# Patient Record
Sex: MALE | Race: BLACK OR AFRICAN AMERICAN | Employment: FULL TIME | URBAN - METROPOLITAN AREA
[De-identification: names, ages, dates, MRNs, and addresses within clinical notes are randomized per-mention and may not be internally consistent; named-entity substitution may affect disease eponyms.]

---

## 2019-02-11 ENCOUNTER — APPOINTMENT (EMERGENCY)
Dept: CT IMAGING | Facility: HOSPITAL | Age: 34
End: 2019-02-11
Payer: COMMERCIAL

## 2019-02-11 ENCOUNTER — HOSPITAL ENCOUNTER (EMERGENCY)
Facility: HOSPITAL | Age: 34
Discharge: HOME | End: 2019-02-11
Attending: EMERGENCY MEDICINE | Admitting: EMERGENCY MEDICINE
Payer: COMMERCIAL

## 2019-02-11 VITALS
TEMPERATURE: 98.9 F | HEART RATE: 69 BPM | OXYGEN SATURATION: 100 % | WEIGHT: 182 LBS | RESPIRATION RATE: 16 BRPM | DIASTOLIC BLOOD PRESSURE: 61 MMHG | SYSTOLIC BLOOD PRESSURE: 109 MMHG

## 2019-02-11 DIAGNOSIS — J36 PERITONSILLAR ABSCESS: Primary | ICD-10-CM

## 2019-02-11 LAB
ANION GAP SERPL CALCULATED.3IONS-SCNC: 9 MMOL/L (ref 4–13)
BASOPHILS # BLD AUTO: 0.03 THOUSANDS/ΜL (ref 0–0.1)
BASOPHILS NFR BLD AUTO: 0 % (ref 0–1)
BUN SERPL-MCNC: 13 MG/DL (ref 5–25)
CALCIUM SERPL-MCNC: 8.9 MG/DL (ref 8.3–10.1)
CHLORIDE SERPL-SCNC: 107 MMOL/L (ref 100–108)
CO2 SERPL-SCNC: 25 MMOL/L (ref 21–32)
CREAT SERPL-MCNC: 1 MG/DL (ref 0.6–1.3)
EOSINOPHIL # BLD AUTO: 0.03 THOUSAND/ΜL (ref 0–0.61)
EOSINOPHIL NFR BLD AUTO: 0 % (ref 0–6)
ERYTHROCYTE [DISTWIDTH] IN BLOOD BY AUTOMATED COUNT: 14.8 % (ref 11.6–15.1)
GFR SERPL CREATININE-BSD FRML MDRD: 113 ML/MIN/1.73SQ M
GLUCOSE SERPL-MCNC: 78 MG/DL (ref 65–140)
HCT VFR BLD AUTO: 41.4 % (ref 36.5–49.3)
HGB BLD-MCNC: 13.6 G/DL (ref 12–17)
IMM GRANULOCYTES # BLD AUTO: 0.05 THOUSAND/UL (ref 0–0.2)
IMM GRANULOCYTES NFR BLD AUTO: 0 % (ref 0–2)
LYMPHOCYTES # BLD AUTO: 1.72 THOUSANDS/ΜL (ref 0.6–4.47)
LYMPHOCYTES NFR BLD AUTO: 13 % (ref 14–44)
MCH RBC QN AUTO: 28.3 PG (ref 26.8–34.3)
MCHC RBC AUTO-ENTMCNC: 32.9 G/DL (ref 31.4–37.4)
MCV RBC AUTO: 86 FL (ref 82–98)
MONOCYTES # BLD AUTO: 1.6 THOUSAND/ΜL (ref 0.17–1.22)
MONOCYTES NFR BLD AUTO: 12 % (ref 4–12)
NEUTROPHILS # BLD AUTO: 9.95 THOUSANDS/ΜL (ref 1.85–7.62)
NEUTS SEG NFR BLD AUTO: 75 % (ref 43–75)
NRBC BLD AUTO-RTO: 0 /100 WBCS
PLATELET # BLD AUTO: 246 THOUSANDS/UL (ref 149–390)
PMV BLD AUTO: 9.1 FL (ref 8.9–12.7)
POTASSIUM SERPL-SCNC: 3.9 MMOL/L (ref 3.5–5.3)
RBC # BLD AUTO: 4.81 MILLION/UL (ref 3.88–5.62)
SODIUM SERPL-SCNC: 141 MMOL/L (ref 136–145)
WBC # BLD AUTO: 13.38 THOUSAND/UL (ref 4.31–10.16)

## 2019-02-11 PROCEDURE — 99284 EMERGENCY DEPT VISIT MOD MDM: CPT

## 2019-02-11 PROCEDURE — 96375 TX/PRO/DX INJ NEW DRUG ADDON: CPT

## 2019-02-11 PROCEDURE — 36415 COLL VENOUS BLD VENIPUNCTURE: CPT | Performed by: PHYSICIAN ASSISTANT

## 2019-02-11 PROCEDURE — 70491 CT SOFT TISSUE NECK W/DYE: CPT

## 2019-02-11 PROCEDURE — 96365 THER/PROPH/DIAG IV INF INIT: CPT

## 2019-02-11 PROCEDURE — 80048 BASIC METABOLIC PNL TOTAL CA: CPT | Performed by: PHYSICIAN ASSISTANT

## 2019-02-11 PROCEDURE — 85025 COMPLETE CBC W/AUTO DIFF WBC: CPT | Performed by: PHYSICIAN ASSISTANT

## 2019-02-11 RX ORDER — KETOROLAC TROMETHAMINE 30 MG/ML
15 INJECTION, SOLUTION INTRAMUSCULAR; INTRAVENOUS ONCE
Status: COMPLETED | OUTPATIENT
Start: 2019-02-11 | End: 2019-02-11

## 2019-02-11 RX ORDER — TRAMADOL HYDROCHLORIDE 50 MG/1
50 TABLET ORAL EVERY 6 HOURS PRN
Qty: 10 TABLET | Refills: 0 | Status: SHIPPED | OUTPATIENT
Start: 2019-02-11

## 2019-02-11 RX ORDER — PREDNISONE 10 MG/1
TABLET ORAL
Qty: 20 TABLET | Refills: 0 | Status: SHIPPED | OUTPATIENT
Start: 2019-02-11

## 2019-02-11 RX ORDER — CLINDAMYCIN PHOSPHATE 600 MG/50ML
600 INJECTION INTRAVENOUS ONCE
Status: COMPLETED | OUTPATIENT
Start: 2019-02-11 | End: 2019-02-11

## 2019-02-11 RX ORDER — NAPROXEN 500 MG/1
500 TABLET ORAL 2 TIMES DAILY WITH MEALS
Qty: 30 TABLET | Refills: 0 | Status: SHIPPED | OUTPATIENT
Start: 2019-02-11

## 2019-02-11 RX ORDER — CLINDAMYCIN HYDROCHLORIDE 300 MG/1
300 CAPSULE ORAL 3 TIMES DAILY
Qty: 30 CAPSULE | Refills: 0 | Status: SHIPPED | OUTPATIENT
Start: 2019-02-11 | End: 2019-02-21

## 2019-02-11 RX ADMIN — CLINDAMYCIN PHOSPHATE 600 MG: 600 INJECTION, SOLUTION INTRAVENOUS at 11:44

## 2019-02-11 RX ADMIN — IOHEXOL 85 ML: 350 INJECTION, SOLUTION INTRAVENOUS at 12:52

## 2019-02-11 RX ADMIN — DEXAMETHASONE SODIUM PHOSPHATE 10 MG: 10 INJECTION, SOLUTION INTRAMUSCULAR; INTRAVENOUS at 11:43

## 2019-02-11 RX ADMIN — KETOROLAC TROMETHAMINE 15 MG: 30 INJECTION, SOLUTION INTRAMUSCULAR at 11:42

## 2019-02-11 RX ADMIN — SODIUM CHLORIDE 1000 ML: 0.9 INJECTION, SOLUTION INTRAVENOUS at 11:42

## 2019-02-11 NOTE — DISCHARGE INSTRUCTIONS
DISCHARGE INSTRUCTIONS:    FOLLOW UP WITH YOUR PRIMARY CARE PROVIDER OR THE [address] [place] [address]  MAKE AN APPOINTMENT TO BE SEEN  TAKE NAPROXEN AS PRESCRIBED FOR MILD PAIN AND INFLAMMATION  IF RASH, SHORTNESS OF BREATH OR TROUBLE SWALLOWING, STOP TAKING THE MEDICATION AND BE SEEN  TAKE TRAMADOL AS PRESCRIBED FOR MODERATE PAIN  IF RASH, SHORTNESS OF BREATH OR TROUBLE SWALLOWING, STOP TAKING THE MEDICATION AND BE SEEN  NO DRINKING, DRIVING OR OPERATING HEAVY MACHINERY WHILE TAKING THIS MEDICATION  TAKE CLINDAMYCIN AS PRESCRIBED  IF RASH, SHORTNESS OF BREATH OR TROUBLE SWALLOWING, STOP TAKING THE MEDICATION AND BE SEEN  TAKE PREDNISONE AS PRESCRIBED  IF RASH, SHORTNESS OF BREATH OR TROUBLE SWALLOWING, STOP TAKING THE MEDICATION AND BE SEEN  REST AND DRINK PLENTY OF FLUIDS  FOLLOW UP WITH THE RECOMMENDED EARS, NOSE AND THROAT SPECIALIST  IF SYMPTOMS WORSEN OR NEW SYMPTOMS ARISE, RETURN TO THE ER TO BE SEEN

## 2019-02-11 NOTE — ED PROVIDER NOTES
History  Chief Complaint   Patient presents with    Sore Throat     worsening x2 days     33y  o male with no significant PMH presents to the ER for sore throat for 2 days  Patient has been drinking tea for symptoms without relief  He is unable to describes his pain  He denies radiation of pain  Symptoms are constant  He denies sick contacts or recent travel  He denies fever, chills, rhinorrhea/congestion, chest pain, dyspnea, N/V/D, abdominal pain, weakness or paresthesias  History provided by:  Patient   used: No        None       History reviewed  No pertinent past medical history  History reviewed  No pertinent surgical history  History reviewed  No pertinent family history  I have reviewed and agree with the history as documented  Social History     Tobacco Use    Smoking status: Never Smoker   Substance Use Topics    Alcohol use: Yes     Comment: occasional     Drug use: Never        Review of Systems   Constitutional: Negative for activity change, appetite change, chills and fever  HENT: Positive for sore throat  Negative for congestion, drooling, ear discharge, ear pain, facial swelling and rhinorrhea  Eyes: Negative for redness  Respiratory: Negative for cough and shortness of breath  Cardiovascular: Negative for chest pain  Gastrointestinal: Negative for abdominal pain, diarrhea, nausea and vomiting  Skin: Negative for rash  Allergic/Immunologic: Negative for food allergies  Neurological: Negative for weakness and numbness  Physical Exam  Physical Exam   Constitutional:  Non-toxic appearance  No distress  HENT:   Head: Normocephalic and atraumatic  Right Ear: Tympanic membrane, external ear and ear canal normal  No drainage, swelling or tenderness  No foreign bodies  Tympanic membrane is not erythematous  No hemotympanum  Left Ear: Tympanic membrane, external ear and ear canal normal  No drainage, swelling or tenderness  No foreign bodies  Tympanic membrane is not erythematous  No hemotympanum  Nose: Nose normal    Mouth/Throat: Uvula is midline, oropharynx is clear and moist and mucous membranes are normal  No uvula swelling  No posterior oropharyngeal edema, posterior oropharyngeal erythema or tonsillar abscesses  Tonsils are 3+ on the right  Tonsils are 4+ on the left  No tonsillar exudate  Uvula mildly displaced  Neck: Normal range of motion and phonation normal  Neck supple  No tracheal deviation present  Cardiovascular: Normal rate, regular rhythm, S1 normal, S2 normal and normal heart sounds  Exam reveals no gallop and no friction rub  No murmur heard  Pulmonary/Chest: Effort normal and breath sounds normal  No respiratory distress  He has no decreased breath sounds  He has no wheezes  He has no rhonchi  He has no rales  He exhibits no tenderness  Neurological: He is alert  GCS eye subscore is 4  GCS verbal subscore is 5  GCS motor subscore is 6  Skin: Skin is warm and dry  No rash noted  Psychiatric: He has a normal mood and affect  Nursing note and vitals reviewed        Vital Signs  ED Triage Vitals [02/11/19 1050]   Temperature Pulse Respirations Blood Pressure SpO2   99 2 °F (37 3 °C) 65 18 111/54 98 %      Temp Source Heart Rate Source Patient Position - Orthostatic VS BP Location FiO2 (%)   Oral Monitor Sitting Right arm --      Pain Score       8           Vitals:    02/11/19 1050 02/11/19 1301 02/11/19 1345   BP: 111/54 116/65 109/61   Pulse: 65 62 69   Patient Position - Orthostatic VS: Sitting Lying Lying       Visual Acuity      ED Medications  Medications   sodium chloride 0 9 % bolus 1,000 mL (0 mL Intravenous Stopped 2/11/19 1301)   ketorolac (TORADOL) injection 15 mg (15 mg Intravenous Given 2/11/19 1142)   dexamethasone 10 mg/mL oral liquid 10 mg 1 mL (10 mg Oral Given 2/11/19 1143)   clindamycin (CLEOCIN) IVPB (premix) 600 mg (0 mg Intravenous Stopped 2/11/19 1233)   iohexol (OMNIPAQUE) 350 MG/ML injection (MULTI-DOSE) 85 mL (85 mL Intravenous Given 2/11/19 1252)       Diagnostic Studies  Results Reviewed     Procedure Component Value Units Date/Time    Basic metabolic panel [[id number]] Collected:  02/11/19 1152    Lab Status:  Final result Specimen:  Blood from Hand, Left Updated:  02/11/19 1215     Sodium 141 mmol/L      Potassium 3 9 mmol/L      Chloride 107 mmol/L      CO2 25 mmol/L      ANION GAP 9 mmol/L      BUN 13 mg/dL      Creatinine 1 00 mg/dL      Glucose 78 mg/dL      Calcium 8 9 mg/dL      eGFR 113 ml/min/1 73sq m     Narrative:       National Kidney Disease Education Program recommendations are as follows:  GFR calculation is accurate only with a steady state creatinine  Chronic Kidney disease less than 60 ml/min/1 73 sq  meters  Kidney failure less than 15 ml/min/1 73 sq  meters  CBC and differential [[id number]]  (Abnormal) Collected:  02/11/19 1152    Lab Status:  Final result Specimen:  Blood from Hand, Left Updated:  02/11/19 1159     WBC 13 38 Thousand/uL      RBC 4 81 Million/uL      Hemoglobin 13 6 g/dL      Hematocrit 41 4 %      MCV 86 fL      MCH 28 3 pg      MCHC 32 9 g/dL      RDW 14 8 %      MPV 9 1 fL      Platelets 016 Thousands/uL      nRBC 0 /100 WBCs      Neutrophils Relative 75 %      Immat GRANS % 0 %      Lymphocytes Relative 13 %      Monocytes Relative 12 %      Eosinophils Relative 0 %      Basophils Relative 0 %      Neutrophils Absolute 9 95 Thousands/µL      Immature Grans Absolute 0 05 Thousand/uL      Lymphocytes Absolute 1 72 Thousands/µL      Monocytes Absolute 1 60 Thousand/µL      Eosinophils Absolute 0 03 Thousand/µL      Basophils Absolute 0 03 Thousands/µL                  CT soft tissue neck with contrast   Final Result by Farooq Baker DO (02/11 1306)      Left-sided tonsillopharyngitis with a peritonsillar abscess best seen on series 2 image 33 and image 56 of coronal reconstructions    Mild surrounding reactive inflammation within the left neck with reactive adenopathy  The study was marked in [facility] for immediate notification  Workstation performed: [id number]                    Procedures  Procedures       Phone Contacts  ED Phone Contact    ED Course  ED Course as of Feb 11 1409   Mon Feb 11, 2019   1315 Paging ENT                                  MDM  Number of Diagnoses or Management Options  Peritonsillar abscess: new and requires workup  Diagnosis management comments: DDX consists of but not limited to: viral syndrome, strep, abscess, mono, peritonsillar abscess    Will check CBC, BMP and CT soft tissue neck  Will give fluids, Clindamycin, Toradol and Decadron  Spoke with Dr Ary Jaquez from ENT in regards to patient's CT findings  Plan is to PO challenge patient  If he can tolerate PO liquids  Will discharge  If not, will admit  Patient tolerating PO liquids  [place]  No findings  At discharge, I instructed the patient to:  -follow up with pcp  -take Naproxen as prescribed for mild pain and inflammation  -take Tramadol as prescribed for moderate pain  -take Clindamycin as prescribed  -take Prednisone as prescribed  -rest and drink plenty of fluids  -follow up with the recommended ENT specialist  -return to the ER if symptoms worsened or new symptoms arose  Patient agreed to this plan and was stable at time of discharge         Amount and/or Complexity of Data Reviewed  Clinical lab tests: ordered and reviewed  Tests in the radiology section of CPT®: ordered and reviewed  Discuss the patient with other providers: yes (Spoke with ENT)    Patient Progress  Patient progress: stable      Disposition  Final diagnoses:   Peritonsillar abscess     Time reflects when diagnosis was documented in both MDM as applicable and the Disposition within this note     Time User Action Codes Description Comment    2/11/2019  1:39 PM Carole, [address] [facility] Peritonsillar abscess       ED Disposition     ED Disposition Condition Date/Time Comment    Discharge Stable Mon Feb 11, 2019  1:39 PM Janneth Floor English discharge to home/self care  Follow-up Information     Follow up With Specialties Details Why Bisi S  Catina, DO Otolaryngology Schedule an appointment as soon as possible for a visit in 1 day [address] [facility] [address]            Discharge Medication List as of 2/11/2019  1:41 PM      START taking these medications    Details   clindamycin (CLEOCIN) 300 MG capsule Take 1 capsule (300 mg total) by mouth 3 (three) times a day for 10 days, Starting Mon 2/11/2019, Until u 2/21/2019, Print      naproxen (NAPROSYN) 500 mg tablet Take 1 tablet (500 mg total) by mouth 2 (two) times a day with meals, Starting Mon 2/11/2019, Print      predniSONE 10 mg tablet Take 4 tablets for 2 days then take 3 tablets for 2 days then take 2 tablets for 2 days then take 1 tablet for 2 days, Print      traMADol (ULTRAM) 50 mg tablet Take 1 tablet (50 mg total) by mouth every 6 (six) hours as needed for moderate pain, Starting Mon 2/11/2019, Print           No discharge procedures on file      ED Provider  Electronically Signed by           Oumou Olea PA-C  02/11/19 2206

## 2019-02-14 ENCOUNTER — HOSPITAL ENCOUNTER (EMERGENCY)
Facility: HOSPITAL | Age: 34
Discharge: HOME | End: 2019-02-14
Attending: EMERGENCY MEDICINE
Payer: COMMERCIAL

## 2019-02-14 VITALS
TEMPERATURE: 99.2 F | DIASTOLIC BLOOD PRESSURE: 67 MMHG | HEART RATE: 71 BPM | RESPIRATION RATE: 18 BRPM | WEIGHT: 183.2 LBS | OXYGEN SATURATION: 100 % | SYSTOLIC BLOOD PRESSURE: 110 MMHG

## 2019-02-14 DIAGNOSIS — J36 PERITONSILLAR ABSCESS: Primary | ICD-10-CM

## 2019-02-14 LAB
ALBUMIN SERPL BCP-MCNC: 3.9 G/DL (ref 3.5–5)
ALP SERPL-CCNC: 51 U/L (ref 46–116)
ALT SERPL W P-5'-P-CCNC: 21 U/L (ref 12–78)
ANION GAP SERPL CALCULATED.3IONS-SCNC: 9 MMOL/L (ref 4–13)
AST SERPL W P-5'-P-CCNC: 19 U/L (ref 5–45)
BASOPHILS # BLD AUTO: 0.04 THOUSANDS/ΜL (ref 0–0.1)
BASOPHILS NFR BLD AUTO: 0 % (ref 0–1)
BILIRUB SERPL-MCNC: 0.7 MG/DL (ref 0.2–1)
BUN SERPL-MCNC: 17 MG/DL (ref 5–25)
CALCIUM SERPL-MCNC: 9.2 MG/DL (ref 8.3–10.1)
CHLORIDE SERPL-SCNC: 102 MMOL/L (ref 100–108)
CO2 SERPL-SCNC: 29 MMOL/L (ref 21–32)
CREAT SERPL-MCNC: 1.01 MG/DL (ref 0.6–1.3)
EOSINOPHIL # BLD AUTO: 0.02 THOUSAND/ΜL (ref 0–0.61)
EOSINOPHIL NFR BLD AUTO: 0 % (ref 0–6)
ERYTHROCYTE [DISTWIDTH] IN BLOOD BY AUTOMATED COUNT: 14.6 % (ref 11.6–15.1)
GFR SERPL CREATININE-BSD FRML MDRD: 112 ML/MIN/1.73SQ M
GLUCOSE SERPL-MCNC: 87 MG/DL (ref 65–140)
HCT VFR BLD AUTO: 39.8 % (ref 36.5–49.3)
HGB BLD-MCNC: 13 G/DL (ref 12–17)
IMM GRANULOCYTES # BLD AUTO: 0.04 THOUSAND/UL (ref 0–0.2)
IMM GRANULOCYTES NFR BLD AUTO: 0 % (ref 0–2)
LYMPHOCYTES # BLD AUTO: 2.73 THOUSANDS/ΜL (ref 0.6–4.47)
LYMPHOCYTES NFR BLD AUTO: 22 % (ref 14–44)
MCH RBC QN AUTO: 27.8 PG (ref 26.8–34.3)
MCHC RBC AUTO-ENTMCNC: 32.7 G/DL (ref 31.4–37.4)
MCV RBC AUTO: 85 FL (ref 82–98)
MONOCYTES # BLD AUTO: 1.55 THOUSAND/ΜL (ref 0.17–1.22)
MONOCYTES NFR BLD AUTO: 12 % (ref 4–12)
NEUTROPHILS # BLD AUTO: 8.16 THOUSANDS/ΜL (ref 1.85–7.62)
NEUTS SEG NFR BLD AUTO: 66 % (ref 43–75)
NRBC BLD AUTO-RTO: 0 /100 WBCS
PLATELET # BLD AUTO: 305 THOUSANDS/UL (ref 149–390)
PMV BLD AUTO: 9 FL (ref 8.9–12.7)
POTASSIUM SERPL-SCNC: 3.8 MMOL/L (ref 3.5–5.3)
PROT SERPL-MCNC: 7.6 G/DL (ref 6.4–8.2)
RBC # BLD AUTO: 4.68 MILLION/UL (ref 3.88–5.62)
SODIUM SERPL-SCNC: 140 MMOL/L (ref 136–145)
WBC # BLD AUTO: 12.54 THOUSAND/UL (ref 4.31–10.16)

## 2019-02-14 PROCEDURE — 99283 EMERGENCY DEPT VISIT LOW MDM: CPT

## 2019-02-14 PROCEDURE — 85025 COMPLETE CBC W/AUTO DIFF WBC: CPT | Performed by: EMERGENCY MEDICINE

## 2019-02-14 PROCEDURE — 96361 HYDRATE IV INFUSION ADD-ON: CPT

## 2019-02-14 PROCEDURE — 96375 TX/PRO/DX INJ NEW DRUG ADDON: CPT

## 2019-02-14 PROCEDURE — 36415 COLL VENOUS BLD VENIPUNCTURE: CPT | Performed by: EMERGENCY MEDICINE

## 2019-02-14 PROCEDURE — 87147 CULTURE TYPE IMMUNOLOGIC: CPT | Performed by: OTOLARYNGOLOGY

## 2019-02-14 PROCEDURE — 96374 THER/PROPH/DIAG INJ IV PUSH: CPT

## 2019-02-14 PROCEDURE — 87205 SMEAR GRAM STAIN: CPT | Performed by: OTOLARYNGOLOGY

## 2019-02-14 PROCEDURE — 87070 CULTURE OTHR SPECIMN AEROBIC: CPT | Performed by: OTOLARYNGOLOGY

## 2019-02-14 PROCEDURE — 80053 COMPREHEN METABOLIC PANEL: CPT | Performed by: EMERGENCY MEDICINE

## 2019-02-14 RX ORDER — MORPHINE SULFATE 4 MG/ML
4 INJECTION, SOLUTION INTRAMUSCULAR; INTRAVENOUS ONCE
Status: COMPLETED | OUTPATIENT
Start: 2019-02-14 | End: 2019-02-14

## 2019-02-14 RX ORDER — HYDROCODONE BITARTRATE AND ACETAMINOPHEN 5; 325 MG/1; MG/1
1 TABLET ORAL EVERY 6 HOURS PRN
Qty: 12 TABLET | Refills: 0 | Status: SHIPPED | OUTPATIENT
Start: 2019-02-14 | End: 2019-02-24

## 2019-02-14 RX ORDER — LIDOCAINE HYDROCHLORIDE AND EPINEPHRINE 10; 10 MG/ML; UG/ML
10 INJECTION, SOLUTION INFILTRATION; PERINEURAL ONCE
Status: COMPLETED | OUTPATIENT
Start: 2019-02-14 | End: 2019-02-14

## 2019-02-14 RX ORDER — DEXAMETHASONE SODIUM PHOSPHATE 4 MG/ML
10 INJECTION, SOLUTION INTRA-ARTICULAR; INTRALESIONAL; INTRAMUSCULAR; INTRAVENOUS; SOFT TISSUE ONCE
Status: COMPLETED | OUTPATIENT
Start: 2019-02-14 | End: 2019-02-14

## 2019-02-14 RX ADMIN — LIDOCAINE HYDROCHLORIDE,EPINEPHRINE BITARTRATE 10 ML: 10; .01 INJECTION, SOLUTION INFILTRATION; PERINEURAL at 03:47

## 2019-02-14 RX ADMIN — TOPICAL ANESTHETIC 2 SPRAY: 200 SPRAY DENTAL; PERIODONTAL at 03:47

## 2019-02-14 RX ADMIN — DEXAMETHASONE SODIUM PHOSPHATE 10 MG: 4 INJECTION, SOLUTION INTRAMUSCULAR; INTRAVENOUS at 01:58

## 2019-02-14 RX ADMIN — MORPHINE SULFATE 4 MG: 4 INJECTION INTRAVENOUS at 01:58

## 2019-02-14 RX ADMIN — SODIUM CHLORIDE 1000 ML: 0.9 INJECTION, SOLUTION INTRAVENOUS at 01:57

## 2019-02-14 NOTE — ED PROVIDER NOTES
History  Chief Complaint   Patient presents with    Abscess     Pt states "I was here monday and I have a abscess on my tonsil and its getting worse"     Pt is a 28year old male with no pertinent PMH presenting with left sided sore throat for 5 days  Pt was diagnosed with a left peritonsillar abscess on 2/11/19 and was discharged home with clindamycin, pain medication, prednisone and to follow up with ENT  He states he was unable to be seen by ENT until 2/25/19  He states that the pain and swelling has increased  States he is unable to tolerate PO because of the pain  Tramadol and naproxen are not working for him  He has been taking his medications as prescribed  States he has increased drooling as well  No fevers  Dr Ashly Berg had been consulted and read the CT, he felt that the pt could be seen outpatient if he could tolerate PO  At the time, the patient could and was discharged  Vitals are stable and he has no signs of airway compromise at this time  Prior to Admission Medications   Prescriptions Last Dose Informant Patient Reported? Taking? clindamycin (CLEOCIN) 300 MG capsule   No Yes   Sig: Take 1 capsule (300 mg total) by mouth 3 (three) times a day for 10 days   naproxen (NAPROSYN) 500 mg tablet   No Yes   Sig: Take 1 tablet (500 mg total) by mouth 2 (two) times a day with meals   predniSONE 10 mg tablet   No Yes   Sig: Take 4 tablets for 2 days then take 3 tablets for 2 days then take 2 tablets for 2 days then take 1 tablet for 2 days   traMADol (ULTRAM) 50 mg tablet   No Yes   Sig: Take 1 tablet (50 mg total) by mouth every 6 (six) hours as needed for moderate pain      Facility-Administered Medications: None       History reviewed  No pertinent past medical history  History reviewed  No pertinent surgical history  History reviewed  No pertinent family history  I have reviewed and agree with the history as documented      Social History     Tobacco Use    Smoking status: Never Smoker  Smokeless tobacco: Never Used   Substance Use Topics    Alcohol use: Yes     Comment: occasional     Drug use: Never        Review of Systems   Constitutional: Negative for activity change, appetite change, chills, diaphoresis, fatigue and fever  HENT: Positive for drooling, sore throat and trouble swallowing  Negative for congestion, dental problem, ear discharge, ear pain, facial swelling, rhinorrhea, sinus pressure, sinus pain and voice change  Eyes: Negative for photophobia, pain, redness and visual disturbance  Respiratory: Negative for cough, chest tightness, shortness of breath, wheezing and stridor  Cardiovascular: Negative for chest pain and leg swelling  Gastrointestinal: Negative for abdominal pain, constipation, diarrhea, nausea and vomiting  Genitourinary: Negative for decreased urine volume and difficulty urinating  Neurological: Negative for dizziness, weakness, light-headedness and headaches  Physical Exam  Physical Exam   Constitutional: He is oriented to person, place, and time  He appears well-developed and well-nourished  No distress  HENT:   Head: Normocephalic and atraumatic  Right Ear: External ear normal    Left Ear: External ear normal    Nose: Nose normal    Mouth/Throat: There is trismus in the jaw  No uvula swelling  Posterior oropharyngeal edema, posterior oropharyngeal erythema and tonsillar abscesses present  No oropharyngeal exudate  Tonsils are 2+ on the right  Tonsils are 4+ on the left  No tonsillar exudate  Mild uvular deviation  Eyes: Pupils are equal, round, and reactive to light  Conjunctivae and EOM are normal  Right eye exhibits no discharge  Left eye exhibits no discharge  No scleral icterus  Neck: Normal range of motion  Neck supple  No JVD present  No tracheal deviation present  Cardiovascular: Normal rate, regular rhythm, normal heart sounds and intact distal pulses     Pulmonary/Chest: Effort normal and breath sounds normal  Abdominal: Soft  Bowel sounds are normal  He exhibits no distension  There is no tenderness  Musculoskeletal: Normal range of motion  Lymphadenopathy:     He has no cervical adenopathy  Neurological: He is alert and oriented to person, place, and time  Skin: Skin is warm and dry  Capillary refill takes less than 2 seconds  He is not diaphoretic  Vital Signs  ED Triage Vitals [02/14/19 0109]   Temperature Pulse Respirations Blood Pressure SpO2   99 2 °F (37 3 °C) 63 18 110/58 98 %      Temp src Heart Rate Source Patient Position - Orthostatic VS BP Location FiO2 (%)   -- -- -- -- --      Pain Score       9           Vitals:    02/14/19 0109 02/14/19 0349   BP: 110/58 110/67   Pulse: 63 71       Visual Acuity      ED Medications  Medications   sodium chloride 0 9 % bolus 1,000 mL (0 mL Intravenous Stopped 2/14/19 0349)   morphine (PF) 4 mg/mL injection 4 mg (4 mg Intravenous Given 2/14/19 0158)   dexamethasone (DECADRON) injection 10 mg (10 mg Intravenous Given 2/14/19 0158)   lidocaine-epinephrine (XYLOCAINE/EPINEPHRINE) 1 %-1:100,000 injection 10 mL (10 mL Infiltration Given 2/14/19 0347)   benzocaine (HURRICAINE) 20 % mucosal spray 2 spray (2 sprays Mucosal Given 2/14/19 0347)       Diagnostic Studies  Results Reviewed     Procedure Component Value Units Date/Time    Wound culture and Gram stain [[id number]] Collected:  02/14/19 0448    Lab Status:   In process Specimen:  Wound from Cheek Updated:  02/14/19 0453    Comprehensive metabolic panel [[id number]] Collected:  02/14/19 0156    Lab Status:  Final result Specimen:  Blood from Arm, Right Updated:  02/14/19 0217     Sodium 140 mmol/L      Potassium 3 8 mmol/L      Chloride 102 mmol/L      CO2 29 mmol/L      ANION GAP 9 mmol/L      BUN 17 mg/dL      Creatinine 1 01 mg/dL      Glucose 87 mg/dL      Calcium 9 2 mg/dL      AST 19 U/L      ALT 21 U/L      Alkaline Phosphatase 51 U/L      Total Protein 7 6 g/dL      Albumin 3 9 g/dL      Total Bilirubin 0 70 mg/dL      eGFR 112 ml/min/1 73sq m     Narrative:       National Kidney Disease Education Program recommendations are as follows:  GFR calculation is accurate only with a steady state creatinine  Chronic Kidney disease less than 60 ml/min/1 73 sq  meters  Kidney failure less than 15 ml/min/1 73 sq  meters  CBC and differential [[id number]]  (Abnormal) Collected:  02/14/19 0156    Lab Status:  Final result Specimen:  Blood from Arm, Right Updated:  02/14/19 0201     WBC 12 54 Thousand/uL      RBC 4 68 Million/uL      Hemoglobin 13 0 g/dL      Hematocrit 39 8 %      MCV 85 fL      MCH 27 8 pg      MCHC 32 7 g/dL      RDW 14 6 %      MPV 9 0 fL      Platelets 442 Thousands/uL      nRBC 0 /100 WBCs      Neutrophils Relative 66 %      Immat GRANS % 0 %      Lymphocytes Relative 22 %      Monocytes Relative 12 %      Eosinophils Relative 0 %      Basophils Relative 0 %      Neutrophils Absolute 8 16 Thousands/µL      Immature Grans Absolute 0 04 Thousand/uL      Lymphocytes Absolute 2 73 Thousands/µL      Monocytes Absolute 1 55 Thousand/µL      Eosinophils Absolute 0 02 Thousand/µL      Basophils Absolute 0 04 Thousands/µL                  No orders to display              Procedures  Procedures       Phone Contacts  ED Phone Contact    ED Course  ED Course as of Feb 14 0454   Thu Feb 14, 2019   0335 Patient is improving with IV Decadron and morphine  Spoke to Dr Bibiana Conley from ENT, and he will come in to attempt to drain the abscess rather than admit the patient  3890 Dr Bibiana Conley was able to drain 5 cc of fluid from the abscess  Patient clinically improved and feels relief  Dr Bibiana Conley states patient is good for discharge and follow up  MDM  Number of Diagnoses or Management Options  Peritonsillar abscess:   Diagnosis management comments: Dr Bibiana Conley was consulted, and was able to drain 5 cc from the abscess in the ED   Patient tolerate this well, and is feeling significant relief  No airway compromise and hemodynamically stable  Dr Lazara Rai states the patient can be discharged with continuation of medications at home, with follow up on 2/25/19  Given Scheller for pain outpatient  Educated on return precautions  Stable and ready for discharge  Disposition  Final diagnoses:   Peritonsillar abscess     Time reflects when diagnosis was documented in both MDM as applicable and the Disposition within this note     Time User Action Codes Description Comment    2/14/2019  3:38 AM Arvind Ovalle 19 Peritonsillar abscess       ED Disposition     ED Disposition Condition Date/Time Comment    Discharge Good u Feb 14, 2019  4:37 AM Robert Hutchison discharge to home/self care  Follow-up Information     Follow up With Specialties Details Why Contact Info    Sejal Madrigal DO Otolaryngology Schedule an appointment as soon as possible for a visit  As needed [address]  [address] [place] [phone]            Patient's Medications   Discharge Prescriptions    HYDROCODONE-ACETAMINOPHEN (NORCO) 5-325 MG PER TABLET    Take 1 tablet by mouth every 6 (six) hours as needed for pain for up to 10 daysMax Daily Amount: 4 tablets       Start Date: 2/14/2019 End Date: 2/24/2019       Order Dose: 1 tablet       Quantity: 12 tablet    Refills: 0     No discharge procedures on file      ED Provider  Electronically Signed by           Yaa Campbell PA-C  02/14/19 2137

## 2019-02-14 NOTE — CONSULTS
Consultation - ENT   Chino Hutchison 28 y o  male MRN: [id number]  Unit/Bed#: ED 14 Encounter: [id number]      Assessment/Plan     Assessment:  Left peritonsillar abscess    Plan:  Incision and drainage of left peritonsillar abscess was successful at the bedside  5 mL of thick pus was aspirated and culture sent to the lab  I recommend that he continue with the clindamycin and prednisone as he was previously taking  At this point his pain is gone and he should be fine for discharge  He should follow up with us in 1 week's time for repeat evaluation to make sure there is no recurrence  History of Present Illness   Physician Requesting Consult: Harrison Goss DO  Reason for Consult / Principal Problem:  Left peritonsillar abscess  HPI: Cornell Nelson is a 28y o  year old male who presents with a left peritonsillar abscess  The patient was seen in the ER on 02/11/2018  He was diagnosed with a small left peritonsillar abscess with some parapharyngeal swelling  At that point formal consultation with ENT was obtained and it was felt that he would be stable to be discharged on oral clindamycin and prednisone  He was tolerating his secretions and was not having any airway compromise  Unfortunately, he states that the symptoms have worsened  Although he has been able to swallow his pills he states that he has increased drooling and increased pain on the left side  He presented back to the emergency room where I was called for repeat consultation  Rather than obtain repeat imaging I elected to see the patient and drain the peritonsillar abscess which had likely Coalesced further than on the original scan  Consults    Review of Systems   Constitutional: Negative  HENT: Positive for drooling, sore throat and trouble swallowing ( he states he has some difficulty swallowing which is secondary to the pain)  Sneezing: left-sided  All other systems reviewed and are negative        Historical Information History reviewed  No pertinent past medical history  History reviewed  No pertinent surgical history  Social History   Social History     Substance and Sexual Activity   Alcohol Use Yes    Comment: occasional      Social History     Substance and Sexual Activity   Drug Use Never     Social History     Tobacco Use   Smoking Status Never Smoker   Smokeless Tobacco Never Used     Family History: History reviewed  No pertinent family history  Meds/Allergies   current meds:   No current facility-administered medications for this encounter  and PTA meds:   Prior to Admission Medications   Prescriptions Last Dose Informant Patient Reported? Taking? clindamycin (CLEOCIN) 300 MG capsule   No Yes   Sig: Take 1 capsule (300 mg total) by mouth 3 (three) times a day for 10 days   naproxen (NAPROSYN) 500 mg tablet   No Yes   Sig: Take 1 tablet (500 mg total) by mouth 2 (two) times a day with meals   predniSONE 10 mg tablet   No Yes   Sig: Take 4 tablets for 2 days then take 3 tablets for 2 days then take 2 tablets for 2 days then take 1 tablet for 2 days   traMADol (ULTRAM) 50 mg tablet   No Yes   Sig: Take 1 tablet (50 mg total) by mouth every 6 (six) hours as needed for moderate pain      Facility-Administered Medications: None       No Known Allergies    Objective       Intake/Output Summary (Last 24 hours) at 2/14/2019 0404  Last data filed at 2/14/2019 0349  Gross per 24 hour   Intake 1000 ml   Output --   Net 1000 ml       Invasive Devices     Peripheral Intravenous Line            Peripheral IV 02/14/19 Left Antecubital less than 1 day                Physical Exam   Constitutional: He appears well-developed and well-nourished  No distress  HENT:   Head: Normocephalic and atraumatic  Right Ear: External ear normal    Left Ear: External ear normal    Mouth/Throat: No oropharyngeal exudate  Tympanic membranes are intact without effusion or retraction    Nasal:  Septum is midline    There is no mucopus or polyp in the nasal cavity  Mucosa is pink and moist    Oral:  Full mobility of the tongue  Moist mucous membranes  Dentition is in good repair  Oropharynx: There is edema of the uvula with a very slight shift to the right side  The right tonsil is cryptic in grade 2 over for the left tonsil was cryptic and grade 3/4  There is no exudate on the tonsil at all  Posterior pharyngeal wall is normal in appearance  Neck: Normal range of motion  Neck supple  No JVD present  No tracheal deviation present  No thyromegaly present  Lymphadenopathy:     He has no cervical adenopathy  Consent was signed and charted for Incision and drainage of a left peritonsillar abscess  The oral cavity was topically sprayed with Hurricane spray  I then injected the left middle and inferior  peritonsillar area with 1% xylocaine with 1:100,000 epinephrine (2 5ml)  After waiting approximately three minutes to allow the anesthesia to work I used a 5ml syringe with a 20 gauge 1 5 inch needle to localize the abscess  I was able to aspirate 5ml of pus from the site  Once localized the area was opened further using  A 15 blade  The patient was allowed to use suction to remove any further secretions or blood  The patient tolerated the procedure well       Lab Results:   CBC:   Lab Results   Component Value Date    WBC 12 54 (H) 02/14/2019    HGB 13 0 02/14/2019    HCT 39 8 02/14/2019    MCV 85 02/14/2019     02/14/2019    MCH 27 8 02/14/2019    MCHC 32 7 02/14/2019    RDW 14 6 02/14/2019    MPV 9 0 02/14/2019    NRBC 0 02/14/2019   , CMP:   Lab Results   Component Value Date    SODIUM 140 02/14/2019    K 3 8 02/14/2019     02/14/2019    CO2 29 02/14/2019    BUN 17 02/14/2019    CREATININE 1 01 02/14/2019    CALCIUM 9 2 02/14/2019    AST 19 02/14/2019    ALT 21 02/14/2019    ALKPHOS 51 02/14/2019    EGFR 112 02/14/2019   , Coags: No results found for: PT, PTT, INR  Imaging Studies: I have personally reviewed pertinent films in PACS  EKG, Pathology, and Other Studies: I have personally reviewed pertinent reports        Code Status: No Order  Advance Directive and Living Will:      Power of :    POLST:      None

## 2019-02-18 LAB
BACTERIA WND AEROBE CULT: ABNORMAL
BACTERIA WND AEROBE CULT: ABNORMAL
GRAM STN SPEC: ABNORMAL

## 2019-03-05 ENCOUNTER — HOSPITAL ENCOUNTER (EMERGENCY)
Facility: HOSPITAL | Age: 34
Discharge: HOME | End: 2019-03-05
Attending: EMERGENCY MEDICINE | Admitting: EMERGENCY MEDICINE
Payer: COMMERCIAL

## 2019-03-05 VITALS
RESPIRATION RATE: 16 BRPM | DIASTOLIC BLOOD PRESSURE: 78 MMHG | OXYGEN SATURATION: 100 % | HEART RATE: 81 BPM | TEMPERATURE: 98.3 F | SYSTOLIC BLOOD PRESSURE: 131 MMHG | WEIGHT: 182 LBS

## 2019-03-05 DIAGNOSIS — J36 PERITONSILLAR ABSCESS: Primary | ICD-10-CM

## 2019-03-05 LAB
ALBUMIN SERPL BCP-MCNC: 3.8 G/DL (ref 3.5–5)
ALP SERPL-CCNC: 55 U/L (ref 46–116)
ALT SERPL W P-5'-P-CCNC: 18 U/L (ref 12–78)
ANION GAP SERPL CALCULATED.3IONS-SCNC: 8 MMOL/L (ref 4–13)
AST SERPL W P-5'-P-CCNC: 15 U/L (ref 5–45)
BASOPHILS # BLD AUTO: 0.02 THOUSANDS/ΜL (ref 0–0.1)
BASOPHILS NFR BLD AUTO: 0 % (ref 0–1)
BILIRUB SERPL-MCNC: 0.62 MG/DL (ref 0.2–1)
BUN SERPL-MCNC: 17 MG/DL (ref 5–25)
CALCIUM SERPL-MCNC: 8.9 MG/DL (ref 8.3–10.1)
CHLORIDE SERPL-SCNC: 103 MMOL/L (ref 100–108)
CO2 SERPL-SCNC: 28 MMOL/L (ref 21–32)
CREAT SERPL-MCNC: 0.95 MG/DL (ref 0.6–1.3)
EOSINOPHIL # BLD AUTO: 0.13 THOUSAND/ΜL (ref 0–0.61)
EOSINOPHIL NFR BLD AUTO: 1 % (ref 0–6)
ERYTHROCYTE [DISTWIDTH] IN BLOOD BY AUTOMATED COUNT: 14.9 % (ref 11.6–15.1)
GFR SERPL CREATININE-BSD FRML MDRD: 120 ML/MIN/1.73SQ M
GLUCOSE SERPL-MCNC: 86 MG/DL (ref 65–140)
HCT VFR BLD AUTO: 39.1 % (ref 36.5–49.3)
HGB BLD-MCNC: 12.9 G/DL (ref 12–17)
IMM GRANULOCYTES # BLD AUTO: 0.03 THOUSAND/UL (ref 0–0.2)
IMM GRANULOCYTES NFR BLD AUTO: 0 % (ref 0–2)
LYMPHOCYTES # BLD AUTO: 1.47 THOUSANDS/ΜL (ref 0.6–4.47)
LYMPHOCYTES NFR BLD AUTO: 16 % (ref 14–44)
MCH RBC QN AUTO: 28 PG (ref 26.8–34.3)
MCHC RBC AUTO-ENTMCNC: 33 G/DL (ref 31.4–37.4)
MCV RBC AUTO: 85 FL (ref 82–98)
MONOCYTES # BLD AUTO: 0.9 THOUSAND/ΜL (ref 0.17–1.22)
MONOCYTES NFR BLD AUTO: 10 % (ref 4–12)
NEUTROPHILS # BLD AUTO: 6.77 THOUSANDS/ΜL (ref 1.85–7.62)
NEUTS SEG NFR BLD AUTO: 73 % (ref 43–75)
NRBC BLD AUTO-RTO: 0 /100 WBCS
PLATELET # BLD AUTO: 256 THOUSANDS/UL (ref 149–390)
PMV BLD AUTO: 9.4 FL (ref 8.9–12.7)
POTASSIUM SERPL-SCNC: 3.8 MMOL/L (ref 3.5–5.3)
PROT SERPL-MCNC: 7 G/DL (ref 6.4–8.2)
RBC # BLD AUTO: 4.61 MILLION/UL (ref 3.88–5.62)
SODIUM SERPL-SCNC: 139 MMOL/L (ref 136–145)
WBC # BLD AUTO: 9.32 THOUSAND/UL (ref 4.31–10.16)

## 2019-03-05 PROCEDURE — 96365 THER/PROPH/DIAG IV INF INIT: CPT

## 2019-03-05 PROCEDURE — 96375 TX/PRO/DX INJ NEW DRUG ADDON: CPT

## 2019-03-05 PROCEDURE — 96366 THER/PROPH/DIAG IV INF ADDON: CPT

## 2019-03-05 PROCEDURE — 36415 COLL VENOUS BLD VENIPUNCTURE: CPT | Performed by: EMERGENCY MEDICINE

## 2019-03-05 PROCEDURE — 87205 SMEAR GRAM STAIN: CPT | Performed by: EMERGENCY MEDICINE

## 2019-03-05 PROCEDURE — 87070 CULTURE OTHR SPECIMN AEROBIC: CPT | Performed by: EMERGENCY MEDICINE

## 2019-03-05 PROCEDURE — 85025 COMPLETE CBC W/AUTO DIFF WBC: CPT | Performed by: EMERGENCY MEDICINE

## 2019-03-05 PROCEDURE — 80053 COMPREHEN METABOLIC PANEL: CPT | Performed by: EMERGENCY MEDICINE

## 2019-03-05 PROCEDURE — 99283 EMERGENCY DEPT VISIT LOW MDM: CPT

## 2019-03-05 RX ORDER — LIDOCAINE HYDROCHLORIDE AND EPINEPHRINE 10; 10 MG/ML; UG/ML
10 INJECTION, SOLUTION INFILTRATION; PERINEURAL ONCE
Status: COMPLETED | OUTPATIENT
Start: 2019-03-05 | End: 2019-03-05

## 2019-03-05 RX ORDER — AMOXICILLIN AND CLAVULANATE POTASSIUM 875; 125 MG/1; MG/1
1 TABLET, FILM COATED ORAL EVERY 12 HOURS
Qty: 20 TABLET | Refills: 0 | Status: SHIPPED | OUTPATIENT
Start: 2019-03-05 | End: 2019-03-15

## 2019-03-05 RX ORDER — CLINDAMYCIN PHOSPHATE 600 MG/50ML
600 INJECTION INTRAVENOUS ONCE
Status: COMPLETED | OUTPATIENT
Start: 2019-03-05 | End: 2019-03-05

## 2019-03-05 RX ORDER — DEXAMETHASONE SODIUM PHOSPHATE 4 MG/ML
10 INJECTION, SOLUTION INTRA-ARTICULAR; INTRALESIONAL; INTRAMUSCULAR; INTRAVENOUS; SOFT TISSUE ONCE
Status: COMPLETED | OUTPATIENT
Start: 2019-03-05 | End: 2019-03-05

## 2019-03-05 RX ORDER — METHYLPREDNISOLONE 4 MG/1
TABLET ORAL
Qty: 21 TABLET | Refills: 0 | Status: SHIPPED | OUTPATIENT
Start: 2019-03-05

## 2019-03-05 RX ORDER — KETOROLAC TROMETHAMINE 30 MG/ML
30 INJECTION, SOLUTION INTRAMUSCULAR; INTRAVENOUS ONCE
Status: COMPLETED | OUTPATIENT
Start: 2019-03-05 | End: 2019-03-05

## 2019-03-05 RX ORDER — IBUPROFEN 800 MG/1
800 TABLET ORAL EVERY 8 HOURS PRN
Qty: 30 TABLET | Refills: 0 | Status: SHIPPED | OUTPATIENT
Start: 2019-03-05 | End: 2019-03-15

## 2019-03-05 RX ADMIN — CLINDAMYCIN PHOSPHATE 600 MG: 600 INJECTION, SOLUTION INTRAVENOUS at 03:38

## 2019-03-05 RX ADMIN — LIDOCAINE HYDROCHLORIDE AND EPINEPHRINE 10 ML: 10; 10 INJECTION, SOLUTION INFILTRATION; PERINEURAL at 05:31

## 2019-03-05 RX ADMIN — KETOROLAC TROMETHAMINE 30 MG: 30 INJECTION, SOLUTION INTRAMUSCULAR at 03:38

## 2019-03-05 RX ADMIN — TOPICAL ANESTHETIC 2 SPRAY: 200 SPRAY DENTAL; PERIODONTAL at 05:31

## 2019-03-05 RX ADMIN — DEXAMETHASONE SODIUM PHOSPHATE 10 MG: 4 INJECTION, SOLUTION INTRAMUSCULAR; INTRAVENOUS at 03:38

## 2019-03-05 RX ADMIN — SODIUM CHLORIDE 1000 ML: 0.9 INJECTION, SOLUTION INTRAVENOUS at 03:38

## 2019-03-07 LAB
BACTERIA WND AEROBE CULT: ABNORMAL
GRAM STN SPEC: ABNORMAL
GRAM STN SPEC: ABNORMAL

## 2019-04-01 ENCOUNTER — HOSPITAL ENCOUNTER (EMERGENCY)
Facility: HOSPITAL | Age: 34
Discharge: HOME | End: 2019-04-01
Attending: EMERGENCY MEDICINE | Admitting: EMERGENCY MEDICINE
Payer: COMMERCIAL

## 2019-04-01 VITALS
HEART RATE: 61 BPM | WEIGHT: 198.19 LBS | TEMPERATURE: 97.8 F | SYSTOLIC BLOOD PRESSURE: 115 MMHG | DIASTOLIC BLOOD PRESSURE: 64 MMHG | OXYGEN SATURATION: 98 % | RESPIRATION RATE: 20 BRPM

## 2019-04-01 DIAGNOSIS — J36 PERITONSILLAR ABSCESS: Primary | ICD-10-CM

## 2019-04-01 PROCEDURE — 99283 EMERGENCY DEPT VISIT LOW MDM: CPT | Performed by: NURSE PRACTITIONER

## 2019-04-01 PROCEDURE — 87147 CULTURE TYPE IMMUNOLOGIC: CPT | Performed by: OTOLARYNGOLOGY

## 2019-04-01 PROCEDURE — 87070 CULTURE OTHR SPECIMN AEROBIC: CPT | Performed by: OTOLARYNGOLOGY

## 2019-04-01 PROCEDURE — 87205 SMEAR GRAM STAIN: CPT | Performed by: OTOLARYNGOLOGY

## 2019-04-01 PROCEDURE — 99282 EMERGENCY DEPT VISIT SF MDM: CPT

## 2019-04-01 PROCEDURE — 96372 THER/PROPH/DIAG INJ SC/IM: CPT

## 2019-04-01 RX ORDER — DEXAMETHASONE SODIUM PHOSPHATE 4 MG/ML
10 INJECTION, SOLUTION INTRA-ARTICULAR; INTRALESIONAL; INTRAMUSCULAR; INTRAVENOUS; SOFT TISSUE ONCE
Status: DISCONTINUED | OUTPATIENT
Start: 2019-04-01 | End: 2019-04-01

## 2019-04-01 RX ORDER — AMOXICILLIN AND CLAVULANATE POTASSIUM 875; 125 MG/1; MG/1
1 TABLET, FILM COATED ORAL EVERY 12 HOURS
Qty: 20 TABLET | Refills: 0 | Status: SHIPPED | OUTPATIENT
Start: 2019-04-01 | End: 2019-04-11

## 2019-04-01 RX ORDER — DEXAMETHASONE SODIUM PHOSPHATE 4 MG/ML
10 INJECTION, SOLUTION INTRA-ARTICULAR; INTRALESIONAL; INTRAMUSCULAR; INTRAVENOUS; SOFT TISSUE ONCE
Status: COMPLETED | OUTPATIENT
Start: 2019-04-01 | End: 2019-04-01

## 2019-04-01 RX ORDER — AMOXICILLIN AND CLAVULANATE POTASSIUM 875; 125 MG/1; MG/1
1 TABLET, FILM COATED ORAL ONCE
Status: COMPLETED | OUTPATIENT
Start: 2019-04-01 | End: 2019-04-01

## 2019-04-01 RX ADMIN — DEXAMETHASONE SODIUM PHOSPHATE 10 MG: 4 INJECTION, SOLUTION INTRAMUSCULAR; INTRAVENOUS at 03:32

## 2019-04-01 RX ADMIN — AMOXICILLIN AND CLAVULANATE POTASSIUM 1 TABLET: 875; 125 TABLET, FILM COATED ORAL at 03:36
